# Patient Record
(demographics unavailable — no encounter records)

---

## 2024-11-25 NOTE — HISTORY OF PRESENT ILLNESS
[de-identified] : 72 y/o woman presenting for breast medical oncology follow up.  6/2017 benign L breast biopsy 9:00  5FN  1/6/20 screening mammogram BIRADS 4B - RUOQ breast calcifications, biopsy recommended  1/16/20 RUOQ biopsy: DCIS solid-cribiform pattern, grade 2 atypia, necrosis, microcalcifications ER >95% strong, OR 0% negative  1/23/20 MRI Breasts: 11-12:00 axis R breast biopsy marker 4cm inferiorly extending biopsy tract and 1.2cm focal nonmass enhancement extending anteriorly from the most inferor aspect of the tract. 0.6cm focal nonmass enhancement lower outer right breast, 0.8cm linear nonmass enhancement inferior anterior retroareolar left breast, biopsies recommended. 0.4cm enhancing skin lesion upper innter left breast. BIRADS 4A  1/29/20 R lower outer quadrant MRI guided core biopsy - DCIS solid and cribiform pattern with int nuclear grade, ER 90% strong, OR 70% strong L anterior inferior MRI guided core biopsy - DCIS with solid papillary features and int nuclear grade, ER 90% strong, OR 90% strong  2/11/20 R 11:00 lumpectomy microinvasive carcinoma (ER>90%, OR negative, HER2 negative IHC 0) in a background of DCIS, negative margins R 8:00 lumpectomy - biopsy site changes, clip, papilloma L breast lumpectomy: DCIS   Completed radiation therapy with Dr. Leon on 4/17/20 and then started anastrozole   Pertinent History: 2017 started Prolia, every 6 months - has osteopenia now (improved with treatment, was osteoporosis years ago), takes vitamin D Rheumatologist Dr. Guerrero PMD Dr. Clarissa Leon - every 6 months Hypothyroidism on synthroid Mother had breast cancer age 68, no siblings 2 children, 4 grandchildren. She lives in [place] with her . 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 Smoked for 15 years when she was younger, quit many years ago.  5/20/2024 Takes AI daily, good compliance. SHe has mild hot flashes. She denies aches/pain, vag dryness, excessive fatigue, GI s/e, hair loss. She is active, no change in energy, wt or appetite.  mammogram/MRI with Dr June. DEXA- 12/2020 osteopenia 2.2, 9/2022, 9/2023 osteoporosis - Prolia with Dr Sagastume.  She takes ca+vit D She is retired, started a support group. She is Saskia MOORE. SHe is eating healthy, wt stable. Alicia Bustamante ( endo)  is her DIL  She had microinvasive disease and therefore 5 years would be ok. Pt feels like its an [place] and wants to do extended duration. Will continue to discuss risks and benefits for extended duration depending on cardiac/bone s/e,  [de-identified] : ER+AK+ [FreeTextEntry1] : started anastrozole 4/2020 [de-identified] : Ms. 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 Tx of care to me, 10/2021 from Dr Hill.  11/25/2024: Patient continues to take Anastrozole with good daily efficacy. Patient denies any excessive side effects consistent with: arthralgias, hot flashes, vaginal dryness, hair thinning, GI s/e's, SOB, excessive fatigue and sleep or mood disturbances. Anastrozole renewed today to pharmacy on file. Breast Health: Last Bilateral Screening Mammogram and Complete Bilateral Breast Ultrasound completed on 6 March 2024; BI-RADS 2. Last Bilateral Breast MRI completed on 20 August 2024; BI-RADS 2. Imaging ordered and managed by Dr. Meredith June.  Bone Health: Last DEXA Bone Density Screening completed on: 09/27/2023. Imaging consistent with osteoporosis located in the Spine (-2.7). Imaging to be repeated in 2 years; next due: 09/2025. Patient continues to undergo Q6 month Prolia with issues or complaints (care managed by Dr. Tank Sagastume). Patient denies any issues or concerns with her dentition. UTD with dentistry. Patient continues to take both Calcium as well as Vitamin D.  Ms. Haines reports she will be hosting Thanksgiving unexpectedly after her son's newly renovated kitchen oven stopped working yesterday. She will be hosting her DIL; (director of ambulatory services at [employer]), her son (PT at [facility]), and her DIL's parents/brother. She is headed to the grocery store after the visit to prepare.  Ms. Haines reports being frustrated with her insurance as they will not approve the previously prescribed Wegovy by Dr. Sagastume. She hopes when she switches her secondary insurance in the New Year, she will have better luck with approval and weight loss.  RTC: 6 Months

## 2024-11-25 NOTE — PHYSICAL EXAM
[de-identified] : s/p b/l lumpectomies well healed, + scar tissue 10:00 right breast from previous surgery

## 2024-11-25 NOTE — HISTORY OF PRESENT ILLNESS
[de-identified] : 72 y/o woman presenting for breast medical oncology follow up.  6/2017 benign L breast biopsy 9:00  5FN  1/6/20 screening mammogram BIRADS 4B - RUOQ breast calcifications, biopsy recommended  1/16/20 RUOQ biopsy: DCIS solid-cribiform pattern, grade 2 atypia, necrosis, microcalcifications ER >95% strong, W[place] 0% negative  1/23/20 MRI Breasts: 11-12:00 axis R breast biopsy marker 4cm inferiorly extending biopsy tract and 1.2cm focal nonmass enhancement extending anteriorly from the most inferor aspect of the tract. 0.6cm focal nonmass enhancement lower outer right breast, 0.8cm linear nonmass enhancement inferior anterior retroareolar left breast, biopsies recommended. 0.4cm enhancing skin lesion upper innter left breast. BIRADS 4A  1/29/20 R lower outer quadrant MRI guided core biopsy - DCIS solid and cribiform pattern with int nuclear grade, ER 90% strong, W[place] 70% strong L anterior inferior MRI guided core biopsy - DCIS with solid papillary features and int nuclear grade, ER 90% strong, W[place] 90% strong  2/11/20 R 11:00 lumpectomy microinvasive carcinoma (ER>90%, W[place] negative, HER2 negative IHC 0) in a background of DCIS, negative margins R 8:00 lumpectomy - biopsy site changes, clip, papilloma L breast lumpectomy: DCIS   Completed radiation therapy with Dr. Leon on 4/17/20 and then started anastrozole   Pertinent History: 2017 started Prolia, every 6 months - has osteopenia now (improved with treatment, was osteoporosis years ago), takes vitamin D Rheumatologist Dr. Guerrero PMD Dr. Clarissa Leon - every 6 months Hypothyroidism on synthroid Mother had breast cancer age 68, no siblings 2 children, 4 grandchildren. She lives in [place] with her . 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  5/20/2024 Takes AI daily, good compliance. SHe has mild hot flashes. She denies aches/pain, vag dryness, excessive fatigue, GI s/e, hair loss. She is active, no change in energy, wt or appetite.  mammogram/MRI with Dr June. DEXA- 12/2020 osteopenia 2.2, 9/2022, 9/2023 osteoporosis - Prolia with Dr Sagastume.  She takes ca+vit D She is retired, started a support group. She is Saskia MOORE. SHe is eating healthy, wt stable. Alicia Bustamante ( endo)  is her DIL  She had microinvasive disease and therefore 5 years would be ok. Pt feels like its an [place] and wants to do extended duration. Will continue to discuss risks and benefits for extended duration depending on cardiac/bone s/e,  [de-identified] : ER+AK+ [FreeTextEntry1] : started anastrozole 4/2020 [de-identified] : Ms. VICENTE HAINES is here for a follow up appt for R DCIS and microinvasive breast cancer (ER+ NC+), L DCIS s/p lumpectomies 2/2020, s/p RT and anastrozole since 4/2020. Tx of care to me, 10/2021 from Dr Hill.  11/25/2024: Patient continues to take Anastrozole with good daily efficacy. Patient denies any excessive side effects consistent with: arthralgias, hot flashes, vaginal dryness, hair thinning, GI s/e's, SOB, excessive fatigue and sleep or mood disturbances. Anastrozole renewed today to pharmacy on file. Breast Health: Last Bilateral Screening Mammogram and Complete Bilateral Breast Ultrasound completed on 6 March 2024; BI-RADS 2. Last Bilateral Breast MRI completed on 20 August 2024; BI-RADS 2. Imaging ordered and managed by Dr. Meredith June.  Bone Health: Last DEXA Bone Density Screening completed on: 09/27/2023. Imaging consistent with osteoporosis located in the Spine (-2.7). Imaging to be repeated in 2 years; next due: 09/2025. Patient continues to undergo Q6 month Prolia with issues or complaints (care managed by Dr. Tank Sagastume). Patient denies any issues or concerns with her dentition. UTD with dentistry. Patient continues to take both Calcium as well as Vitamin D.  Ms. Haines reports she will be hosting Thanksgiving unexpectedly after her son's newly renovated kitchen oven stopped working yesterday. She will be hosting her DIL; (director of ambulatory services at [facility]), her son (PT at [facility]), and her DIL's parents/brother. She is headed to the grocery store after the visit to prepare.  Ms. Haines reports being frustrated with her insurance as they will not approve the previously prescribed Wegovy by Dr. Sagastume. She hopes when she switches her secondary insurance in the New Year, she will have better luck with approval and weight loss.  RTC: 6 Months

## 2024-11-25 NOTE — ASSESSMENT
[FreeTextEntry1] : 76 y/o woman with R DCIS and microinvasive breast cancer (ER+ TN+), L DCIS s/p lumpectomies 2/2020 presenting for medical oncology follow up on Arimidex since 4/2020. Tx of care in 10/2021 from Dr Hill.  1. Breast Cancer- Ms. VICENTE HAINES is tolerating AI well, good compliance. Patient denies any excessive side effects consistent with: arthralgias, hot flashes, vaginal dryness, hair thinning, GI s/e's, SOB, excessive fatigue and sleep or mood disturbances. Anastrozole renewed today to pharmacy on file.  Patient feels like medication is like armor and wants to complete an extended duration (7 years). Will continue to discuss risks and benefits for extended duration depending on cardiac/bone s/e. Breast Health: Last Bilateral Screening Mammogram and Complete Bilateral Breast Ultrasound completed on 6 March 2024; BI-RADS 2. Last Bilateral Breast MRI completed on 20 August 2024; BI-RADS 2. Imaging ordered and managed by Dr. Meredith June.  2. Osteoporosis: Concern for worsening bone density and fractures due to Anastrozole. Last DEXA Bone Density Screening completed on: 09/27/2023. Imaging consistent with osteoporosis located in the Spine (-2.7). Imaging to be repeated in 2 years; next due: 09/2025. Patient continues to undergo Q6 month Prolia with issues or complaints (care managed by Dr. Tank Sagastume). Patient denies any issues or concerns with her dentition. UTD with dentistry. Patient continues to take both Calcium as well as Vitamin D.  3. Concern for worsening cholesterol/CAD risk factors due to Anastrozole. Lipid profile annually with PMD. Life style modifications d/w her. 4. Discussed with patient that exercise and lifestyle modifications can result in improvement of fatigue, anxiety, sleep disturbance, depression and ultimately overall survival. Based on the current literature, an effective exercise prescription that most consistently addresses health-related outcomes experienced due to cancer diagnosis and cancer treatment includes moderate intensity aerobic training at least 3 times per week, for at least 30 minutes for at least 8-12 weeks. The addition of resistance training to aerobic training, at least 2 times per week, using at least 2 sets of 8-15 repetitions at least 60% of one repetition maximum, appears to result in similar benefits (Suzi et all 2019, American College of Sports Medicine).  RTC 6 months

## 2024-11-25 NOTE — BEGINNING OF VISIT
[0] : 2) Feeling down, depressed, or hopeless: Not at all (0) [[id number]] : 0 [Pain Scale: ___] : On a scale of 1-10, today the patient's pain is a(n) [unfilled]. [Never] : Never [Reviewed, no changes] : Reviewed, no changes [Abdominal Pain] : no abdominal pain [Vomiting] : no vomiting [Constipation] : no constipation [Diarrhea Character] : Diarrhea: Grade 0

## 2024-11-25 NOTE — PHYSICAL EXAM
[de-identified] : s/p b/l lumpectomies well healed, + scar tissue 10:00 right breast from previous surgery

## 2024-11-25 NOTE — ASSESSMENT
[FreeTextEntry1] : 76 y/o woman with R DCIS and microinvasive breast cancer (ER+ OK+), L DCIS s/p lumpectomies 2/2020 presenting for medical oncology follow up on Arimidex since 4/2020. Tx of care in 10/2021 from Dr Hill.  1. Breast Cancer- Ms. VICENTE HAINES is tolerating AI well, good compliance. Patient denies any excessive side effects consistent with: arthralgias, hot flashes, vaginal dryness, hair thinning, GI s/e's, SOB, excessive fatigue and sleep or mood disturbances. Anastrozole renewed today to pharmacy on file.  Patient feels like medication is like armor and wants to complete an extended duration (7 years). Will continue to discuss risks and benefits for extended duration depending on cardiac/bone s/e. Breast Health: Last Bilateral Screening Mammogram and Complete Bilateral Breast Ultrasound completed on 6 March 2024; BI-RADS 2. Last Bilateral Breast MRI completed on 20 August 2024; BI-RADS 2. Imaging ordered and managed by Dr. Meredith June.  2. Osteoporosis: Concern for worsening bone density and fractures due to Anastrozole. Last DEXA Bone Density Screening completed on: 09/27/2023. Imaging consistent with osteoporosis located in the Spine (-2.7). Imaging to be repeated in 2 years; next due: 09/2025. Patient continues to undergo Q6 month Prolia with issues or complaints (care managed by Dr. Tank Sagastume). Patient denies any issues or concerns with her dentition. UTD with dentistry. Patient continues to take both Calcium as well as Vitamin D.  3. Concern for worsening cholesterol/CAD risk factors due to Anastrozole. Lipid profile annually with PMD. Life style modifications d/w her. 4. Discussed with patient that exercise and lifestyle modifications can result in improvement of fatigue, anxiety, sleep disturbance, depression and ultimately overall survival. Based on the current literature, an effective exercise prescription that most consistently addresses health-related outcomes experienced due to cancer diagnosis and cancer treatment includes moderate intensity aerobic training at least 3 times per week, for at least 30 minutes for at least 8-12 weeks. The addition of resistance training to aerobic training, at least 2 times per week, using at least 2 sets of 8-15 repetitions at least 60% of one repetition maximum, appears to result in similar benefits (Suzi et all 2019, American College of Sports Medicine).  RTC 6 months

## 2024-11-25 NOTE — BEGINNING OF VISIT
[0] : 2) Feeling down, depressed, or hopeless: Not at all (0) [[id number]] : 0 [Pain Scale: ___] : On a scale of 1-10, today the patient's pain is a(n) [unfilled]. [Never] : Never [Reviewed, no changes] : Reviewed, no changes [Abdominal Pain] : no abdominal pain [Vomiting] : no vomiting [Constipation] : no constipation [Diarrhea Character] : Diarrhea: Grade 0

## 2025-05-19 NOTE — ASSESSMENT
[FreeTextEntry1] : 76 y/o woman with R DCIS and microinvasive breast cancer (ER+ AK+), L DCIS s/p lumpectomies 2/2020 presenting for medical oncology follow up on Arimidex since 4/2020. Tx of care in 10/2021 from Dr Hill.  1. Breast Cancer- Ms. VICENTE HAINES is tolerating AI well, good compliance. Patient denies any excessive side effects consistent with: arthralgias, hot flashes, vaginal dryness, hair thinning, GI s/e's, SOB, excessive fatigue and sleep or mood disturbances.  She has gained more weight, cholesterol is borderline. She has bone loss issues. Given DCIS with microinvasion, would recommend to stop AI at this time.  2. Osteoporosis: Concern for worsening bone density and fractures due to Anastrozole. Last DEXA Bone Density Screening completed on: 09/27/2023. Imaging consistent with osteoporosis located in the Spine (-2.7). Imaging to be repeated in 2 years; next due: 09/2025. Patient continues to undergo Q6 month Prolia with issues or complaints (care managed by Dr. Tank Sagastume). Patient denies any issues or concerns with her dentition. UTD with dentistry. Patient continues to take both Calcium as well as Vitamin D.  3. Concern for worsening cholesterol/CAD risk factors due to Anastrozole. Lipid profile annually with PMD. Life style modifications d/w her. 4. Discussed with patient that exercise and lifestyle modifications can result in improvement of fatigue, anxiety, sleep disturbance, depression and ultimately overall survival. Based on the current literature, an effective exercise prescription that most consistently addresses health-related outcomes experienced due to cancer diagnosis and cancer treatment includes moderate intensity aerobic training at least 3 times per week, for at least 30 minutes for at least 8-12 weeks. The addition of resistance training to aerobic training, at least 2 times per week, using at least 2 sets of 8-15 repetitions at least 60% of one repetition maximum, appears to result in similar benefits (Suzi et all 2019, American College of Sports Medicine).  RTC in survivorship

## 2025-05-19 NOTE — HISTORY OF PRESENT ILLNESS
[Disease: _____________________] : Disease: [unfilled] [T: ___] : T[unfilled] [N: ___] : N[unfilled] [M: ___] : M[unfilled] [AJCC Stage: ____] : AJCC Stage: [unfilled] [de-identified] : 70 y/o woman presenting for breast medical oncology follow up.  6/2017 benign L breast biopsy 9:00  5FN  1/6/20 screening mammogram BIRADS 4B - RUOQ breast calcifications, biopsy recommended  1/16/20 RUOQ biopsy: DCIS solid-cribiform pattern, grade 2 atypia, necrosis, microcalcifications ER >95% strong, [place] 0% negative  1/23/20 MRI Breasts: 11-12:00 axis R breast biopsy marker 4cm inferiorly extending biopsy tract and 1.2cm focal nonmass enhancement extending anteriorly from the most inferor aspect of the tract. 0.6cm focal nonmass enhancement lower outer right breast, 0.8cm linear nonmass enhancement inferior anterior retroareolar left breast, biopsies recommended. 0.4cm enhancing skin lesion upper innter left breast. BIRADS 4A  1/29/20 R lower outer quadrant MRI guided core biopsy - DCIS solid and cribiform pattern with int nuclear grade, ER 90% strong, [place] 70% strong L anterior inferior MRI guided core biopsy - DCIS with solid papillary features and int nuclear grade, ER 90% strong, VT 90% strong  2/11/20 R 11:00 lumpectomy microinvasive carcinoma (ER>90%, VT negative, HER2 negative IHC 0) in a background of DCIS, negative margins R 8:00 lumpectomy - biopsy site changes, clip, papilloma L breast lumpectomy: DCIS   Completed radiation therapy with Dr. Leon on 4/17/20 and then started anastrozole   Pertinent History: 2017 started Prolia, every 6 months - has osteopenia now (improved with treatment, was osteoporosis years ago), takes vitamin D Rheumatologist Dr. Guerrero PMD Dr. Clarissa Leon - every 6 months Hypothyroidism on synthroid Mother had breast cancer age 68, no siblings 2 children, 4 grandchildren. She lives in [place] with her . 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  5/20/2024 Takes AI daily, good compliance. SHe has mild hot flashes. She denies aches/pain, vag dryness, excessive fatigue, GI s/e, hair loss. She is active, no change in energy, wt or appetite.  mammogram/MRI with Dr June. DEXA- 12/2020 osteopenia 2.2, 9/2022, 9/2023 osteoporosis - Prolia with Dr Sagastume.  She takes ca+vit D She is retired, started a support group. She is Saskia MOORE. SHe is eating healthy, wt stable. Alicia Bustamante ( endo)  is her DIL  She had microinvasive disease and therefore 5 years would be ok. Pt feels like its an [place] and wants to do extended duration. Will continue to discuss risks and benefits for extended duration depending on cardiac/bone s/e,  [de-identified] : ER+NE+ [FreeTextEntry1] : started anastrozole 4/2020 [de-identified] : Ms. VICENTE HAINES is here for a follow up appt for R DCIS and microinvasive breast cancer (ER+ ID+), L DCIS s/p lumpectomies 2/2020, s/p RT and anastrozole since 4/2020. Tx of care to me, 10/2021 from Dr Hill.  5/2025 Patient continues to take Anastrozole with good daily efficacy. Patient denies any excessive side effects consistent with: arthralgias, hot flashes, vaginal dryness, hair thinning, GI s/e's, SOB, excessive fatigue and sleep or mood disturbances.  Breast Health: Last Bilateral Screening Mammogram and Complete Bilateral Breast Ultrasound completed on 6 March 2024; BI-RADS 2. Last Bilateral Breast MRI completed on 20 August 2024; BI-RADS 2. Imaging ordered and managed by Dr. Meredith June.  Bone Health: Last DEXA Bone Density Screening completed on: 09/27/2023. Imaging consistent with osteoporosis located in the Spine (-2.7). Imaging to be repeated in 2 years; next due: 09/2025. Patient continues to undergo Q6 month Prolia with issues or complaints (care managed by Dr. Tank Sagastume). Patient denies any issues or concerns with her dentition. UTD with dentistry. Patient continues to take both Calcium as well as Vitamin D.  She has gained more weight, cholesterol is borderline. She has bone loss issues. Given DCIS with microinvasion, would recommend to stop AI at this time

## 2025-05-19 NOTE — PHYSICAL EXAM
[Fully active, able to carry on all pre-disease performance without restriction] : Status 0 - Fully active, able to carry on all pre-disease performance without restriction [Normal] : affect appropriate [de-identified] : s/p b/l lumpectomies well healed, + scar tissue 10:00 right breast from previous surgery

## 2025-06-04 NOTE — ASSESSMENT
[Denosumab Therapy] : Risks  and benefits of denosumab therapy were discussed with the patient including eczema, cellulitis, osteonecrosis of the jaw and atypical femur fractures [Levothyroxine] : The patient was instructed to take Levothyroxine on an empty stomach, separate from vitamins, and wait at least 30 minutes before eating [FreeTextEntry1] : 76 y/o female w/ osteoporosis and hypothyroidism.  Pt states she was told of low bone density 15 years ago. She initially rx with Actonel, she later transitioned to Prolia ~2011 and is still continuing. Tolerating well. No thigh pain, no interval fx. Normal Ca. No ONJ. Fh/o osteoporosis possibly in mother. H/o breast CA 2/2020 DCIS, rx with lumpectomy and RT, no chemo. Pt. stopped taking anastrozole 06/01/2025. No h/o fx. No unusual risks for osteoporosis.   Outside BMD 12/2020 indicates osteopenia in spine and normal hip. BMD 2/2022 in office indicates worsened osteoporosis in spine but unsure if this is really due to change in analysis, hip and proximal radius c/w osteopenia, hips essentially normal although change in position in total hip. BMD September 2022 indicates that there was an increase in bone density in the spine.   BMD 2023 stable. BMD 06/2025 shows      . BMD discussed with pt. Continue Prolia, buy and bill.  H/o hypothyroidism. Clinically euthyroid on LT4 125 mcg daily. No local neck pain. No dysphagia or dysphonia. No raciness, shakiness, heat/cold intolerance, tiredness, or fatigue. No palpitations, tremors, or sudden weight gain/loss.  H/o low Vitamin D, continue Vitamin D 2000 IU daily.  Request labs for Clarissa Leon MD  Follow up in 6 months   DAVE Murdock, JSHOBHA Wilson, M Markos, ML Itzel, MAU Rendon, M Nolvia Gabriel, CC Gluer, T rosanna Gorman, R GILL Mccoy, DARIELA Ramos, B Scarlett, S Andi, J Nadine, N Al-Dina, X Harry, E Veena Thornton, A Oseas, R Mau, RE Gustavo. Management of Aromatase Inhibitor-Associated Bone Loss (AIBL) in postmenopausal women with hormone sensitive breast cancer: Joint position statement of the IOF, CABS, ECTS, IEG, ESCEO, IMS, and SIOG. Journal of Bone Oncology, Vol. 7, June 2017, Pages 1-12

## 2025-06-04 NOTE — PROCEDURE
[FreeTextEntry1] : Bone Mineral Density: 06/04/2025 Indication: vs 2023 to assess response to medication Spine: -2.9 osteoporosis (-2.9%) Total hip: -1.3 osteopenia, NSC Femoral neck: -1.5 osteopenia, (-6.2%) Proximal radius: -2.4 osteopenia, NSC TBS: 1.299 partially degraded microarchitecture.  Bone mineral density: 09/27/2023 indication: vs 2022. assess response to medication  spine -2.7 osteoporosis, no significant change  total hip -1.3 osteopenia, no significant change  femoral neck -1.1 osteopenia, no significant change  proximal radius -1.6 osteopenia, no significant change   Bone Density September 6, 2022 Compared to February 2022 which apparently showed decrease in spine. No fee Spine-2.7 osteoporosis +4.9%   Bone mineral density: 02/22/2022  Indication: vs. outside study 2020 Spine: (L1-L4) -3.0 osteoporosis, prior (L2-L4) -2.5 osteoporosis Total hip: -1.1 osteopenia, prior -0.6 normal but change in position Femoral neck: -1.2 osteopenia, prior -1.0 osteopenia Proximal radius: -1.5 osteopenia, no prior  Bone mineral density: 12/2020 Indication: vs. 2018 Spine: -2.2 osteopenia, prior -2.1 Total hip: -0.6 normal, prior -1.0 L, -0.8 R Femoral neck: -1.0 normal, prior -1.5 L, -1.3 R

## 2025-06-04 NOTE — END OF VISIT
[FreeTextEntry3] : This note was written by Christi Lewis on (June 4, 2025) acting as a medical scribe for Dr. Sagastume. This note was authored by the medical scribe for me. I have reviewed, edited, and revised the note as needed. I am in agreement with the exam findings, imaging findings, and treatment plan. Tank Sagastume MD

## 2025-06-04 NOTE — ASSESSMENT
[Denosumab Therapy] : Risks  and benefits of denosumab therapy were discussed with the patient including eczema, cellulitis, osteonecrosis of the jaw and atypical femur fractures [Levothyroxine] : The patient was instructed to take Levothyroxine on an empty stomach, separate from vitamins, and wait at least 30 minutes before eating [FreeTextEntry1] : 74 y/o female w/ osteoporosis and hypothyroidism.  Pt states she was told of low bone density 15 years ago. She initially rx with Actonel, she later transitioned to Prolia ~2011 and is still continuing. Tolerating well. No thigh pain, no interval fx. Normal Ca. No ONJ. Fh/o osteoporosis possibly in mother. H/o breast CA 2/2020 DCIS, rx with lumpectomy and RT, no chemo. Pt. stopped taking anastrozole 06/01/2025. No h/o fx. No unusual risks for osteoporosis.   Outside BMD 12/2020 indicates osteopenia in spine and normal hip. BMD 2/2022 in office indicates worsened osteoporosis in spine but unsure if this is really due to change in analysis, hip and proximal radius c/w osteopenia, hips essentially normal although change in position in total hip. BMD September 2022 indicates that there was an increase in bone density in the spine.   BMD 2023 stable. BMD 06/2025 shows      . BMD discussed with pt. Continue Prolia, buy and bill.  H/o hypothyroidism. Clinically euthyroid on LT4 125 mcg daily. No local neck pain. No dysphagia or dysphonia. No raciness, shakiness, heat/cold intolerance, tiredness, or fatigue. No palpitations, tremors, or sudden weight gain/loss.  H/o low Vitamin D, continue Vitamin D 2000 IU daily.  Request labs for Clarissa Leon MD  Follow up in 6 months   DAVE Murdock, JSHOBHA Wilson, M Markos, ML Itzel, MAU Rendon, M Nolvia Gabriel, CC Gluer, T rosanna Gorman, R GILL Mccoy, DARIELA Ramos, B Scarlett, S Andi, J Nadine, N Al-Dina, X Harry, E Veena Thornton, A Oseas, R Mau, RE Gustavo. Management of Aromatase Inhibitor-Associated Bone Loss (AIBL) in postmenopausal women with hormone sensitive breast cancer: Joint position statement of the IOF, CABS, ECTS, IEG, ESCEO, IMS, and SIOG. Journal of Bone Oncology, Vol. 7, June 2017, Pages 1-12

## 2025-06-04 NOTE — HISTORY OF PRESENT ILLNESS
[Risedronate (Actonel)] : Risedronate [Denosumab (Prolia)] : Denosumab [FreeTextEntry1] : Patient returns for a follow up visit for osteoporosis and hypothyroidism. No significant interval health changes. No interval surgery, hospitalizations, fractures. Pt. states she stopped taking anastrozole 06/01/2025.  Pt states she was told of low bone density 15 years ago. She initially rx with Actonel, she later transitioned to Prolia ~2011 and is still continuing. Prev treated by rheum, Dr Guerrero. Tolerating well. No thigh pain, no interval fx.  H/o breast CA 2/2020 DCIS, rx with lumpectomy and RT, no chemo. Pt. stopped taking anastrozole 06/01/2025. No h/o fx. No h/o kidney stones or calcium problems. No heart, lung, kidney, liver, stomach problems. No neurological or psychological problems. No ulcers or bleeding. No unusual risks for osteoporosis. No chronic prednisone use. No h/o Paget's disease. Diet includes moderate dairy. Exercises regularly. Former smoker d/c 25-30 years ago.  Menopause 52. No HRT use. Up to date with mammography and GYN. Fh/o osteoporosis possibly in mother.  H/o hypothyroidism. Clinically euthyroid on LT4 125 mcg daily. No local neck pain. No dysphagia or dysphonia. No raciness, shakiness, heat/cold intolerance, tiredness, or fatigue. No palpitations, tremors, or sudden weight gain/loss.  H/o low vitamin D, previously 27 before routine rx, pt takes Vitamin D 2000 IU daily.  Had repeat breast biopsy 2021, awaiting results. Managed by Dr. Norah Hill.